# Patient Record
Sex: FEMALE | Race: WHITE | Employment: PART TIME | URBAN - METROPOLITAN AREA
[De-identification: names, ages, dates, MRNs, and addresses within clinical notes are randomized per-mention and may not be internally consistent; named-entity substitution may affect disease eponyms.]

---

## 2019-09-24 DIAGNOSIS — R53.83 FATIGUE, UNSPECIFIED TYPE: ICD-10-CM

## 2019-09-24 DIAGNOSIS — L65.9 HAIR LOSS: ICD-10-CM

## 2019-09-24 LAB
BASOPHILS ABSOLUTE: 0 K/UL (ref 0–0.2)
BASOPHILS RELATIVE PERCENT: 1 %
EOSINOPHILS ABSOLUTE: 0.1 K/UL (ref 0–0.6)
EOSINOPHILS RELATIVE PERCENT: 2.5 %
HCT VFR BLD CALC: 37.9 % (ref 36–48)
HEMOGLOBIN: 12.6 G/DL (ref 12–16)
LYMPHOCYTES ABSOLUTE: 1.3 K/UL (ref 1–5.1)
LYMPHOCYTES RELATIVE PERCENT: 36.3 %
MCH RBC QN AUTO: 31 PG (ref 26–34)
MCHC RBC AUTO-ENTMCNC: 33.2 G/DL (ref 31–36)
MCV RBC AUTO: 93.3 FL (ref 80–100)
MONOCYTES ABSOLUTE: 0.2 K/UL (ref 0–1.3)
MONOCYTES RELATIVE PERCENT: 5.6 %
NEUTROPHILS ABSOLUTE: 1.9 K/UL (ref 1.7–7.7)
NEUTROPHILS RELATIVE PERCENT: 54.6 %
PDW BLD-RTO: 12.7 % (ref 12.4–15.4)
PLATELET # BLD: 236 K/UL (ref 135–450)
PMV BLD AUTO: 8.5 FL (ref 5–10.5)
RBC # BLD: 4.06 M/UL (ref 4–5.2)
T4 FREE: 1.4 NG/DL (ref 0.9–1.8)
TSH SERPL DL<=0.05 MIU/L-ACNC: 1.02 UIU/ML (ref 0.27–4.2)
WBC # BLD: 3.6 K/UL (ref 4–11)

## 2020-05-08 DIAGNOSIS — R20.2 PARESTHESIAS: ICD-10-CM

## 2020-05-08 LAB
A/G RATIO: 1.7 (ref 1.1–2.2)
ALBUMIN SERPL-MCNC: 4.5 G/DL (ref 3.4–5)
ALP BLD-CCNC: 57 U/L (ref 40–129)
ALT SERPL-CCNC: 40 U/L (ref 10–40)
ANION GAP SERPL CALCULATED.3IONS-SCNC: 13 MMOL/L (ref 3–16)
AST SERPL-CCNC: 20 U/L (ref 15–37)
BASOPHILS ABSOLUTE: 0 K/UL (ref 0–0.2)
BASOPHILS RELATIVE PERCENT: 0.6 %
BILIRUB SERPL-MCNC: 0.3 MG/DL (ref 0–1)
BUN BLDV-MCNC: 13 MG/DL (ref 7–20)
CALCIUM SERPL-MCNC: 9.1 MG/DL (ref 8.3–10.6)
CHLORIDE BLD-SCNC: 106 MMOL/L (ref 99–110)
CO2: 19 MMOL/L (ref 21–32)
CREAT SERPL-MCNC: 0.7 MG/DL (ref 0.6–1.1)
EOSINOPHILS ABSOLUTE: 0.1 K/UL (ref 0–0.6)
EOSINOPHILS RELATIVE PERCENT: 1.5 %
FOLATE: 7.87 NG/ML (ref 4.78–24.2)
GFR AFRICAN AMERICAN: >60
GFR NON-AFRICAN AMERICAN: >60
GLOBULIN: 2.6 G/DL
GLUCOSE BLD-MCNC: 93 MG/DL (ref 70–99)
HCT VFR BLD CALC: 42.7 % (ref 36–48)
HEMOGLOBIN: 14.2 G/DL (ref 12–16)
LYMPHOCYTES ABSOLUTE: 1.8 K/UL (ref 1–5.1)
LYMPHOCYTES RELATIVE PERCENT: 35.3 %
MCH RBC QN AUTO: 31.1 PG (ref 26–34)
MCHC RBC AUTO-ENTMCNC: 33.4 G/DL (ref 31–36)
MCV RBC AUTO: 93.2 FL (ref 80–100)
MONOCYTES ABSOLUTE: 0.3 K/UL (ref 0–1.3)
MONOCYTES RELATIVE PERCENT: 6.4 %
NEUTROPHILS ABSOLUTE: 2.9 K/UL (ref 1.7–7.7)
NEUTROPHILS RELATIVE PERCENT: 56.2 %
PDW BLD-RTO: 13.1 % (ref 12.4–15.4)
PLATELET # BLD: 233 K/UL (ref 135–450)
PMV BLD AUTO: 8.9 FL (ref 5–10.5)
POTASSIUM SERPL-SCNC: 4.6 MMOL/L (ref 3.5–5.1)
RBC # BLD: 4.58 M/UL (ref 4–5.2)
SODIUM BLD-SCNC: 138 MMOL/L (ref 136–145)
T4 FREE: 1 NG/DL (ref 0.9–1.8)
TOTAL PROTEIN: 7.1 G/DL (ref 6.4–8.2)
TSH SERPL DL<=0.05 MIU/L-ACNC: 1.51 UIU/ML (ref 0.27–4.2)
VITAMIN B-12: 656 PG/ML (ref 211–911)
WBC # BLD: 5.1 K/UL (ref 4–11)

## 2021-03-17 PROBLEM — G47.33 OSA (OBSTRUCTIVE SLEEP APNEA): Status: ACTIVE | Noted: 2021-03-17

## 2021-06-15 DIAGNOSIS — G47.00 INSOMNIA, UNSPECIFIED TYPE: ICD-10-CM

## 2021-06-15 DIAGNOSIS — G25.81 RESTLESS LEGS: ICD-10-CM

## 2021-06-15 DIAGNOSIS — Z83.3 FAMILY HISTORY OF DIABETES MELLITUS (DM): ICD-10-CM

## 2021-06-15 DIAGNOSIS — Z11.59 NEED FOR HEPATITIS C SCREENING TEST: ICD-10-CM

## 2021-06-15 LAB
A/G RATIO: 2 (ref 1.1–2.2)
ALBUMIN SERPL-MCNC: 4.3 G/DL (ref 3.4–5)
ALP BLD-CCNC: 56 U/L (ref 40–129)
ALT SERPL-CCNC: 23 U/L (ref 10–40)
ANION GAP SERPL CALCULATED.3IONS-SCNC: 11 MMOL/L (ref 3–16)
AST SERPL-CCNC: 17 U/L (ref 15–37)
BASOPHILS ABSOLUTE: 0 K/UL (ref 0–0.2)
BASOPHILS RELATIVE PERCENT: 0.8 %
BILIRUB SERPL-MCNC: 0.3 MG/DL (ref 0–1)
BUN BLDV-MCNC: 13 MG/DL (ref 7–20)
CALCIUM SERPL-MCNC: 8.9 MG/DL (ref 8.3–10.6)
CHLORIDE BLD-SCNC: 105 MMOL/L (ref 99–110)
CO2: 23 MMOL/L (ref 21–32)
CREAT SERPL-MCNC: 0.6 MG/DL (ref 0.6–1.1)
EOSINOPHILS ABSOLUTE: 0.1 K/UL (ref 0–0.6)
EOSINOPHILS RELATIVE PERCENT: 1.4 %
GFR AFRICAN AMERICAN: >60
GFR NON-AFRICAN AMERICAN: >60
GLOBULIN: 2.2 G/DL
GLUCOSE BLD-MCNC: 88 MG/DL (ref 70–99)
HCT VFR BLD CALC: 37.9 % (ref 36–48)
HEMOGLOBIN: 13.2 G/DL (ref 12–16)
HEPATITIS C ANTIBODY INTERPRETATION: NORMAL
IRON SATURATION: 36 % (ref 15–50)
IRON: 94 UG/DL (ref 37–145)
LYMPHOCYTES ABSOLUTE: 1.7 K/UL (ref 1–5.1)
LYMPHOCYTES RELATIVE PERCENT: 32.8 %
MCH RBC QN AUTO: 31.6 PG (ref 26–34)
MCHC RBC AUTO-ENTMCNC: 34.7 G/DL (ref 31–36)
MCV RBC AUTO: 90.9 FL (ref 80–100)
MONOCYTES ABSOLUTE: 0.3 K/UL (ref 0–1.3)
MONOCYTES RELATIVE PERCENT: 5.7 %
NEUTROPHILS ABSOLUTE: 3 K/UL (ref 1.7–7.7)
NEUTROPHILS RELATIVE PERCENT: 59.3 %
PDW BLD-RTO: 12.8 % (ref 12.4–15.4)
PLATELET # BLD: 238 K/UL (ref 135–450)
PMV BLD AUTO: 8.7 FL (ref 5–10.5)
POTASSIUM SERPL-SCNC: 4.8 MMOL/L (ref 3.5–5.1)
RBC # BLD: 4.17 M/UL (ref 4–5.2)
SODIUM BLD-SCNC: 139 MMOL/L (ref 136–145)
TOTAL IRON BINDING CAPACITY: 261 UG/DL (ref 260–445)
TOTAL PROTEIN: 6.5 G/DL (ref 6.4–8.2)
TSH SERPL DL<=0.05 MIU/L-ACNC: 1.14 UIU/ML (ref 0.27–4.2)
WBC # BLD: 5 K/UL (ref 4–11)

## 2021-06-16 LAB
ESTIMATED AVERAGE GLUCOSE: 91.1 MG/DL
HBA1C MFR BLD: 4.8 %

## 2023-05-26 DIAGNOSIS — Z83.3 FAMILY HISTORY OF DIABETES MELLITUS (DM): ICD-10-CM

## 2023-05-26 DIAGNOSIS — Z13.220 SCREENING CHOLESTEROL LEVEL: ICD-10-CM

## 2023-05-26 DIAGNOSIS — G47.33 OSA (OBSTRUCTIVE SLEEP APNEA): ICD-10-CM

## 2023-05-26 LAB
CHOLEST SERPL-MCNC: 205 MG/DL (ref 0–199)
HDLC SERPL-MCNC: 59 MG/DL (ref 40–60)
LDL CHOLESTEROL CALCULATED: 112 MG/DL
TRIGL SERPL-MCNC: 171 MG/DL (ref 0–150)
VLDLC SERPL CALC-MCNC: 34 MG/DL

## 2023-05-27 LAB
EST. AVERAGE GLUCOSE BLD GHB EST-MCNC: 96.8 MG/DL
HBA1C MFR BLD: 5 %

## 2023-06-30 ENCOUNTER — OFFICE VISIT (OUTPATIENT)
Dept: FAMILY MEDICINE CLINIC | Age: 38
End: 2023-06-30
Payer: COMMERCIAL

## 2023-06-30 VITALS
DIASTOLIC BLOOD PRESSURE: 78 MMHG | HEART RATE: 94 BPM | WEIGHT: 233 LBS | OXYGEN SATURATION: 98 % | HEIGHT: 63 IN | SYSTOLIC BLOOD PRESSURE: 110 MMHG | BODY MASS INDEX: 41.29 KG/M2

## 2023-06-30 DIAGNOSIS — G47.33 OSA (OBSTRUCTIVE SLEEP APNEA): ICD-10-CM

## 2023-06-30 DIAGNOSIS — G47.00 INSOMNIA, UNSPECIFIED TYPE: ICD-10-CM

## 2023-06-30 DIAGNOSIS — G44.209 TENSION HEADACHE: ICD-10-CM

## 2023-06-30 DIAGNOSIS — E78.2 MIXED HYPERLIPIDEMIA: ICD-10-CM

## 2023-06-30 PROCEDURE — 99214 OFFICE O/P EST MOD 30 MIN: CPT

## 2023-06-30 RX ORDER — BUTALBITAL, ACETAMINOPHEN AND CAFFEINE 50; 325; 40 MG/1; MG/1; MG/1
1 TABLET ORAL EVERY 8 HOURS PRN
Qty: 30 TABLET | Refills: 1 | Status: SHIPPED | OUTPATIENT
Start: 2023-06-30

## 2023-06-30 RX ORDER — PEN NEEDLE, DIABETIC 31 GX5/16"
1 NEEDLE, DISPOSABLE MISCELLANEOUS DAILY
Qty: 12 EACH | Refills: 1 | Status: SHIPPED | OUTPATIENT
Start: 2023-06-30

## 2023-06-30 RX ORDER — DOXEPIN HYDROCHLORIDE 75 MG/1
75 CAPSULE ORAL NIGHTLY
Qty: 90 CAPSULE | Refills: 1 | Status: SHIPPED | OUTPATIENT
Start: 2023-06-30 | End: 2023-12-27

## 2023-06-30 ASSESSMENT — ENCOUNTER SYMPTOMS: SHORTNESS OF BREATH: 0

## 2023-08-16 DIAGNOSIS — E78.2 MIXED HYPERLIPIDEMIA: ICD-10-CM

## 2023-08-16 DIAGNOSIS — G47.33 OSA (OBSTRUCTIVE SLEEP APNEA): ICD-10-CM

## 2023-08-16 RX ORDER — LIRAGLUTIDE 6 MG/ML
INJECTION SUBCUTANEOUS
Qty: 6 ML | OUTPATIENT
Start: 2023-08-16

## 2023-09-08 ENCOUNTER — OFFICE VISIT (OUTPATIENT)
Dept: FAMILY MEDICINE CLINIC | Age: 38
End: 2023-09-08
Payer: COMMERCIAL

## 2023-09-08 VITALS
OXYGEN SATURATION: 99 % | HEIGHT: 63 IN | DIASTOLIC BLOOD PRESSURE: 78 MMHG | HEART RATE: 107 BPM | BODY MASS INDEX: 39.8 KG/M2 | RESPIRATION RATE: 18 BRPM | SYSTOLIC BLOOD PRESSURE: 118 MMHG | WEIGHT: 224.6 LBS

## 2023-09-08 DIAGNOSIS — L30.9 ECZEMA, UNSPECIFIED TYPE: Primary | ICD-10-CM

## 2023-09-08 DIAGNOSIS — E78.2 MIXED HYPERLIPIDEMIA: ICD-10-CM

## 2023-09-08 DIAGNOSIS — K21.9 GASTROESOPHAGEAL REFLUX DISEASE, UNSPECIFIED WHETHER ESOPHAGITIS PRESENT: ICD-10-CM

## 2023-09-08 DIAGNOSIS — G47.33 OSA (OBSTRUCTIVE SLEEP APNEA): ICD-10-CM

## 2023-09-08 PROCEDURE — 99214 OFFICE O/P EST MOD 30 MIN: CPT

## 2023-09-08 RX ORDER — PREDNISONE 20 MG/1
40 TABLET ORAL DAILY
Qty: 10 TABLET | Refills: 0 | Status: SHIPPED | OUTPATIENT
Start: 2023-09-08 | End: 2023-09-13

## 2023-09-08 RX ORDER — PANTOPRAZOLE SODIUM 40 MG/1
40 TABLET, DELAYED RELEASE ORAL DAILY
Qty: 90 TABLET | Refills: 1 | Status: SHIPPED | OUTPATIENT
Start: 2023-09-08 | End: 2024-03-06

## 2023-09-08 RX ORDER — PEN NEEDLE, DIABETIC 31 GX5/16"
1 NEEDLE, DISPOSABLE MISCELLANEOUS DAILY
Qty: 12 EACH | Refills: 1 | Status: SHIPPED | OUTPATIENT
Start: 2023-09-08

## 2023-09-08 RX ORDER — PANTOPRAZOLE SODIUM 40 MG/1
TABLET, DELAYED RELEASE ORAL
COMMUNITY
Start: 2023-07-11 | End: 2023-09-08 | Stop reason: SDUPTHER

## 2023-09-08 RX ORDER — FLUOCINONIDE 0.5 MG/G
OINTMENT TOPICAL
Qty: 60 G | Refills: 1 | Status: SHIPPED | OUTPATIENT
Start: 2023-09-08 | End: 2023-09-15

## 2023-09-08 SDOH — ECONOMIC STABILITY: HOUSING INSECURITY
IN THE LAST 12 MONTHS, WAS THERE A TIME WHEN YOU DID NOT HAVE A STEADY PLACE TO SLEEP OR SLEPT IN A SHELTER (INCLUDING NOW)?: NO

## 2023-09-08 SDOH — ECONOMIC STABILITY: INCOME INSECURITY: HOW HARD IS IT FOR YOU TO PAY FOR THE VERY BASICS LIKE FOOD, HOUSING, MEDICAL CARE, AND HEATING?: NOT HARD AT ALL

## 2023-09-08 SDOH — ECONOMIC STABILITY: TRANSPORTATION INSECURITY
IN THE PAST 12 MONTHS, HAS LACK OF TRANSPORTATION KEPT YOU FROM MEETINGS, WORK, OR FROM GETTING THINGS NEEDED FOR DAILY LIVING?: NO

## 2023-09-08 SDOH — ECONOMIC STABILITY: FOOD INSECURITY: WITHIN THE PAST 12 MONTHS, THE FOOD YOU BOUGHT JUST DIDN'T LAST AND YOU DIDN'T HAVE MONEY TO GET MORE.: NEVER TRUE

## 2023-09-08 SDOH — ECONOMIC STABILITY: FOOD INSECURITY: WITHIN THE PAST 12 MONTHS, YOU WORRIED THAT YOUR FOOD WOULD RUN OUT BEFORE YOU GOT MONEY TO BUY MORE.: NEVER TRUE

## 2023-09-08 ASSESSMENT — ENCOUNTER SYMPTOMS
CONSTIPATION: 0
BLOOD IN STOOL: 0
ABDOMINAL PAIN: 0
VOMITING: 0
SHORTNESS OF BREATH: 0
DIARRHEA: 0
NAUSEA: 0

## 2023-09-08 NOTE — PROGRESS NOTES
9/8/2023    SARA [address] [facility] [address]    Chief Complaint   Patient presents with    1 Month Follow-Up     6 weeks. No complaints. Rash     Rash arms, hands, neck, chest X 1 week. Itches. HPI  History was obtained from patient. Julio Geller is a pleasant 42 y.o. female who presents today for 6 week follow up on weight management with saxenda. Weight management: started on Saxenda 1.8mg  in June- has tolerated well. Had a GI virus and took a week off but has resumed without incident. She notes that she has had some increased acid reflux that is worse at night time- started on Protonix and this has resolved. Down 16lbs since beginning of the year, feeling better and noticing that clothes are fitting better. Rash: Rash on bilateral arms across her neck. Itchy. No one else has this rash. She notes that she has had significant increase in stress. Denies known exposure, has not changed any hygiene products or soaps. 1. Eczema, unspecified type    2. BMI 40.0-44.9, adult ([address] [facility])    3. Mixed hyperlipidemia    4. ELIJAH (obstructive sleep apnea)    5. Gastroesophageal reflux disease, unspecified whether esophagitis present             REVIEW OF SYMPTOMS    Review of Systems   Respiratory:  Negative for shortness of breath. Cardiovascular:  Negative for chest pain and palpitations. Gastrointestinal:  Negative for abdominal pain, blood in stool, constipation, diarrhea, nausea and vomiting. Skin:  Positive for rash.      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

## 2023-11-29 DIAGNOSIS — G47.00 INSOMNIA, UNSPECIFIED TYPE: ICD-10-CM

## 2023-11-30 RX ORDER — DOXEPIN HYDROCHLORIDE 75 MG/1
CAPSULE ORAL
Qty: 90 CAPSULE | Refills: 1 | Status: SHIPPED | OUTPATIENT
Start: 2023-11-30

## 2023-12-04 ENCOUNTER — OFFICE VISIT (OUTPATIENT)
Dept: FAMILY MEDICINE CLINIC | Age: 38
End: 2023-12-04
Payer: COMMERCIAL

## 2023-12-04 VITALS
HEIGHT: 63 IN | WEIGHT: 222.3 LBS | RESPIRATION RATE: 16 BRPM | HEART RATE: 102 BPM | OXYGEN SATURATION: 98 % | BODY MASS INDEX: 39.39 KG/M2 | SYSTOLIC BLOOD PRESSURE: 114 MMHG | DIASTOLIC BLOOD PRESSURE: 82 MMHG

## 2023-12-04 DIAGNOSIS — G44.209 TENSION HEADACHE: ICD-10-CM

## 2023-12-04 DIAGNOSIS — G47.33 OSA (OBSTRUCTIVE SLEEP APNEA): ICD-10-CM

## 2023-12-04 DIAGNOSIS — E78.2 MIXED HYPERLIPIDEMIA: ICD-10-CM

## 2023-12-04 DIAGNOSIS — K21.9 GASTROESOPHAGEAL REFLUX DISEASE, UNSPECIFIED WHETHER ESOPHAGITIS PRESENT: Primary | ICD-10-CM

## 2023-12-04 PROCEDURE — 99214 OFFICE O/P EST MOD 30 MIN: CPT

## 2023-12-04 RX ORDER — FAMOTIDINE 20 MG/1
20 TABLET, FILM COATED ORAL 2 TIMES DAILY
Qty: 120 TABLET | Refills: 0 | Status: SHIPPED | OUTPATIENT
Start: 2023-12-04

## 2023-12-04 RX ORDER — OMEPRAZOLE 40 MG/1
40 CAPSULE, DELAYED RELEASE ORAL 2 TIMES DAILY
Qty: 120 CAPSULE | Refills: 0 | Status: SHIPPED | OUTPATIENT
Start: 2023-12-04

## 2023-12-04 RX ORDER — BUTALBITAL, ACETAMINOPHEN AND CAFFEINE 50; 325; 40 MG/1; MG/1; MG/1
1 TABLET ORAL EVERY 8 HOURS PRN
Qty: 30 TABLET | Refills: 1 | Status: SHIPPED | OUTPATIENT
Start: 2023-12-04

## 2023-12-04 RX ORDER — CYCLOBENZAPRINE HCL 10 MG
10 TABLET ORAL 3 TIMES DAILY PRN
Qty: 30 TABLET | Refills: 0 | Status: SHIPPED | OUTPATIENT
Start: 2023-12-04 | End: 2023-12-14

## 2023-12-04 ASSESSMENT — ENCOUNTER SYMPTOMS
NAUSEA: 1
VOMITING: 1
ABDOMINAL PAIN: 1
DIARRHEA: 0
CONSTIPATION: 0
BLOOD IN STOOL: 0
SHORTNESS OF BREATH: 0

## 2023-12-04 NOTE — PROGRESS NOTES
12/4/2023    SARA [facility]    Chief Complaint   Patient presents with    Gastroesophageal Reflux     Having a lot of indigestion, burping all the time, pain at base of sternum. Medication and food feels like it is stuck in her throat. Has vomited twice now and it was bile. HPI  History was obtained from patient. Esequiel Bautista is a pleasant 34 y.o. female who presents today for GERD. Reports that she wakes up and goes to bed with indigestion, she has thrown up bile twice in the past month. She reports discomfort in her epigastric area. Increased belching. She has not been taking any NSAIDs. Of note she was hospitalized with nausea na vomiting in July and CT of the abdomen showed: Impression  1. Fluid-filled small bowel and large bowel loops could be due to diarrhea or an enterocolitis. 2.  No obstructive uropathy. 3.  Cholecystectomy and hysterectomy. She has never had an EGD. She is still taking Liraglutide, when she had the hospitalization over the summer she was advised to stop the GLP1 for a few weeks to see if symptoms improved and they did not so she restarted it. When she eats or drinks her epigastric pain occurs within minutes and lasts for a few hours. No changes in BMs, no blood in stool or black stools. 1. Gastroesophageal reflux disease, unspecified whether esophagitis present    2. Tension headache    3. BMI 40.0-44.9, adult ([address])    4. Mixed hyperlipidemia    5. ELIJAH (obstructive sleep apnea)         REVIEW OF SYMPTOMS    Review of Systems   Respiratory:  Negative for shortness of breath. Cardiovascular:  Negative for chest pain and palpitations. Gastrointestinal:  Positive for abdominal pain, nausea and vomiting. Negative for blood in stool, constipation and diarrhea.        PAST MEDICAL HISTORY  Past Medical History:   Diagnosis Date    Anxiety     Broken teeth     Bottom Right, Bottom Left    Eczema 5/7/2019    Herpes Dx 2013    \"Herpes Of Right Eye\"    Herpes simplex

## 2023-12-04 NOTE — PATIENT INSTRUCTIONS
[facility]  [address], [address] [facility], [address]  [phone]

## 2023-12-05 ENCOUNTER — TELEPHONE (OUTPATIENT)
Dept: GASTROENTEROLOGY | Age: 38
End: 2023-12-05

## 2023-12-12 ENCOUNTER — TELEPHONE (OUTPATIENT)
Dept: FAMILY MEDICINE CLINIC | Age: 38
End: 2023-12-12

## 2023-12-12 DIAGNOSIS — K21.9 GASTROESOPHAGEAL REFLUX DISEASE, UNSPECIFIED WHETHER ESOPHAGITIS PRESENT: Primary | ICD-10-CM

## 2023-12-12 NOTE — TELEPHONE ENCOUNTER
HB REF THIS PT TO [facility] BUT THEY CAN'T GET HER IN UNTIL THE END OF JAN.  PT WANTS TO CX THAT REF AND HAVE A REF SENT TO [facility] ON [address].

## 2023-12-31 DIAGNOSIS — E78.2 MIXED HYPERLIPIDEMIA: ICD-10-CM

## 2023-12-31 DIAGNOSIS — G47.33 OSA (OBSTRUCTIVE SLEEP APNEA): ICD-10-CM

## 2024-01-02 RX ORDER — LIRAGLUTIDE 6 MG/ML
INJECTION SUBCUTANEOUS
Qty: 9 ML | OUTPATIENT
Start: 2024-01-02

## 2024-01-25 DIAGNOSIS — G47.33 OSA (OBSTRUCTIVE SLEEP APNEA): ICD-10-CM

## 2024-01-25 DIAGNOSIS — E78.2 MIXED HYPERLIPIDEMIA: ICD-10-CM

## 2024-01-25 RX ORDER — PEN NEEDLE, DIABETIC 31 GX5/16"
1 NEEDLE, DISPOSABLE MISCELLANEOUS DAILY
Qty: 12 EACH | Refills: 1 | Status: SHIPPED | OUTPATIENT
Start: 2024-01-25

## 2024-02-19 ENCOUNTER — OFFICE VISIT (OUTPATIENT)
Dept: FAMILY MEDICINE CLINIC | Age: 39
End: 2024-02-19
Payer: COMMERCIAL

## 2024-02-19 VITALS
HEIGHT: 63 IN | WEIGHT: 223.2 LBS | SYSTOLIC BLOOD PRESSURE: 132 MMHG | TEMPERATURE: 98 F | DIASTOLIC BLOOD PRESSURE: 98 MMHG | HEART RATE: 112 BPM | RESPIRATION RATE: 16 BRPM | BODY MASS INDEX: 39.55 KG/M2 | OXYGEN SATURATION: 96 %

## 2024-02-19 DIAGNOSIS — J06.9 VIRAL URI: ICD-10-CM

## 2024-02-19 DIAGNOSIS — R03.0 ELEVATED BLOOD PRESSURE READING: ICD-10-CM

## 2024-02-19 DIAGNOSIS — U07.1 COVID-19: Primary | ICD-10-CM

## 2024-02-19 LAB
INFLUENZA A ANTIGEN, POC: NEGATIVE
INFLUENZA B ANTIGEN, POC: NEGATIVE
LOT EXPIRE DATE: ABNORMAL
LOT KIT NUMBER: ABNORMAL
SARS-COV-2, POC: DETECTED
VALID INTERNAL CONTROL: ABNORMAL
VENDOR AND KIT NAME POC: ABNORMAL

## 2024-02-19 PROCEDURE — 87428 SARSCOV & INF VIR A&B AG IA: CPT | Performed by: NURSE PRACTITIONER

## 2024-02-19 PROCEDURE — 99213 OFFICE O/P EST LOW 20 MIN: CPT | Performed by: NURSE PRACTITIONER

## 2024-02-19 ASSESSMENT — ENCOUNTER SYMPTOMS
COUGH: 0
SINUS PRESSURE: 1
SORE THROAT: 1
SHORTNESS OF BREATH: 0
WHEEZING: 0
DIARRHEA: 0
SWOLLEN GLANDS: 0
NAUSEA: 0
RHINORRHEA: 1
CHEST TIGHTNESS: 0
ABDOMINAL PAIN: 0
VOMITING: 0
SINUS PAIN: 1

## 2024-02-19 NOTE — PROGRESS NOTES
SARA VELASQUEZ   38 y.o.  female  [id number]      Chief Complaint   Patient presents with    URI     X2 Days, pt states she was tested on Friday at [facility] with  and was negative, and was given Tamiflu and has been taking that.         Subjective:  38 y.o.female is here for a follow up. She has the following chronic/acute medical problems:  Patient Active Problem List   Diagnosis    Pyelonephritis    Menorrhagia, premenopausal    Obesity    Herpes simplex keratoconjunctivitis    Eczema    Paresthesias    Insomnia    ELIJAH (obstructive sleep apnea)    Hypersomnia    Ex-smoker    Restless leg syndrome    Anemia    ALEXANDER (generalized anxiety disorder)    Lipoma of abdominal wall       URI   This is a new problem. The current episode started yesterday. The problem has been unchanged. There has been no fever. Associated symptoms include congestion (nasal congestion), headaches, rhinorrhea, sinus pain, sneezing (states a little not much) and a sore throat (states a little - states scratchy). Pertinent negatives include no abdominal pain, chest pain, coughing, diarrhea, dysuria, ear pain, joint pain, joint swelling, nausea, neck pain, plugged ear sensation, rash, swollen glands, vomiting or wheezing. Treatments tried: tamiflu prophalatically cause everyone in household had influenza - tylenol cold and flu. The treatment provided mild relief.       Review of Systems   Constitutional:  Positive for fatigue. Negative for appetite change, chills and fever.   HENT:  Positive for congestion (nasal congestion), rhinorrhea, sinus pressure, sinus pain, sneezing (states a little not much) and sore throat (states a little - states scratchy). Negative for ear pain and postnasal drip.    Respiratory:  Negative for cough, chest tightness, shortness of breath and wheezing.    Cardiovascular:  Negative for chest pain.   Gastrointestinal:  Negative for abdominal pain, diarrhea, nausea and vomiting.   Genitourinary:  Negative

## 2024-02-23 ENCOUNTER — NURSE ONLY (OUTPATIENT)
Dept: FAMILY MEDICINE CLINIC | Age: 39
End: 2024-02-23
Payer: COMMERCIAL

## 2024-02-23 DIAGNOSIS — G43.909 ACUTE MIGRAINE: Primary | ICD-10-CM

## 2024-02-23 PROCEDURE — 96372 THER/PROPH/DIAG INJ SC/IM: CPT

## 2024-02-23 RX ORDER — SUMATRIPTAN 50 MG/1
50 TABLET, FILM COATED ORAL
Qty: 9 TABLET | Refills: 0 | Status: SHIPPED | OUTPATIENT
Start: 2024-02-23 | End: 2024-02-23

## 2024-02-23 RX ORDER — KETOROLAC TROMETHAMINE 30 MG/ML
60 INJECTION, SOLUTION INTRAMUSCULAR; INTRAVENOUS ONCE
Status: COMPLETED | OUTPATIENT
Start: 2024-02-23 | End: 2024-02-23

## 2024-02-23 RX ADMIN — KETOROLAC TROMETHAMINE 60 MG: 30 INJECTION, SOLUTION INTRAMUSCULAR; INTRAVENOUS at 12:51

## 2024-03-10 DIAGNOSIS — G44.209 TENSION HEADACHE: ICD-10-CM

## 2024-03-11 ENCOUNTER — OFFICE VISIT (OUTPATIENT)
Dept: FAMILY MEDICINE CLINIC | Age: 39
End: 2024-03-11
Payer: COMMERCIAL

## 2024-03-11 VITALS
OXYGEN SATURATION: 98 % | SYSTOLIC BLOOD PRESSURE: 120 MMHG | HEIGHT: 63 IN | DIASTOLIC BLOOD PRESSURE: 88 MMHG | WEIGHT: 223 LBS | BODY MASS INDEX: 39.51 KG/M2 | HEART RATE: 94 BPM

## 2024-03-11 DIAGNOSIS — U07.1 COVID-19: ICD-10-CM

## 2024-03-11 DIAGNOSIS — H10.33 ACUTE CONJUNCTIVITIS OF BOTH EYES, UNSPECIFIED ACUTE CONJUNCTIVITIS TYPE: Primary | ICD-10-CM

## 2024-03-11 PROCEDURE — 99213 OFFICE O/P EST LOW 20 MIN: CPT | Performed by: PHYSICIAN ASSISTANT

## 2024-03-11 RX ORDER — OFLOXACIN 3 MG/ML
2 SOLUTION/ DROPS OPHTHALMIC
COMMUNITY
Start: 2024-03-09 | End: 2024-03-16

## 2024-03-11 RX ORDER — DOXEPIN HYDROCHLORIDE 75 MG/1
CAPSULE ORAL
Qty: 30 CAPSULE | Refills: 0 | Status: CANCELLED | OUTPATIENT
Start: 2024-03-11

## 2024-03-11 RX ORDER — SUMATRIPTAN 50 MG/1
50 TABLET, FILM COATED ORAL
Qty: 9 TABLET | Refills: 0 | Status: CANCELLED | OUTPATIENT
Start: 2024-03-11 | End: 2024-03-11

## 2024-03-11 RX ORDER — VALACYCLOVIR HYDROCHLORIDE 500 MG/1
500 TABLET, FILM COATED ORAL 2 TIMES DAILY PRN
Qty: 14 TABLET | Refills: 0 | Status: CANCELLED | OUTPATIENT
Start: 2024-03-11

## 2024-03-11 RX ORDER — TOBRAMYCIN AND DEXAMETHASONE 3; 1 MG/ML; MG/ML
1 SUSPENSION/ DROPS OPHTHALMIC 3 TIMES DAILY
Qty: 10 ML | Refills: 0 | Status: SHIPPED | OUTPATIENT
Start: 2024-03-11 | End: 2024-03-16

## 2024-03-11 RX ORDER — BUTALBITAL, ACETAMINOPHEN AND CAFFEINE 50; 325; 40 MG/1; MG/1; MG/1
1 TABLET ORAL EVERY 8 HOURS PRN
Qty: 30 TABLET | Refills: 1 | Status: SHIPPED | OUTPATIENT
Start: 2024-03-11

## 2024-03-11 NOTE — PROGRESS NOTES
day.  Stop the Ofloxacin drops.  Start Tobradex.  Return as needed.    COVID-19       There are no discontinued medications.     No follow-ups on file.     Plan of care reviewed with patient who verbalizes understanding and wishes to continue.   Patient to call with any questions or concerns.                 Please note that this chart was generated using dragon dictation software.  Although every effort was made to ensure the accuracy of this automated transcription, some errors in transcription may have occurred.    Electronically signed by MESFIN BRODY PA-C on 3/11/2024

## 2024-04-02 ENCOUNTER — TELEPHONE (OUTPATIENT)
Dept: FAMILY MEDICINE CLINIC | Age: 39
End: 2024-04-02

## 2024-04-02 ENCOUNTER — OFFICE VISIT (OUTPATIENT)
Dept: FAMILY MEDICINE CLINIC | Age: 39
End: 2024-04-02
Payer: COMMERCIAL

## 2024-04-02 VITALS
SYSTOLIC BLOOD PRESSURE: 124 MMHG | DIASTOLIC BLOOD PRESSURE: 80 MMHG | BODY MASS INDEX: 39.6 KG/M2 | HEART RATE: 94 BPM | OXYGEN SATURATION: 99 % | HEIGHT: 63 IN | WEIGHT: 223.5 LBS

## 2024-04-02 DIAGNOSIS — G47.33 OSA (OBSTRUCTIVE SLEEP APNEA): ICD-10-CM

## 2024-04-02 DIAGNOSIS — E78.2 MIXED HYPERLIPIDEMIA: ICD-10-CM

## 2024-04-02 DIAGNOSIS — G44.209 TENSION HEADACHE: ICD-10-CM

## 2024-04-02 DIAGNOSIS — G47.00 INSOMNIA, UNSPECIFIED TYPE: ICD-10-CM

## 2024-04-02 PROCEDURE — 99214 OFFICE O/P EST MOD 30 MIN: CPT

## 2024-04-02 RX ORDER — DOXEPIN HYDROCHLORIDE 75 MG/1
CAPSULE ORAL
Qty: 90 CAPSULE | Refills: 1 | Status: CANCELLED | OUTPATIENT
Start: 2024-04-02

## 2024-04-02 RX ORDER — DOXEPIN HYDROCHLORIDE 25 MG/1
CAPSULE ORAL
Qty: 21 CAPSULE | Refills: 0 | Status: SHIPPED | OUTPATIENT
Start: 2024-04-02 | End: 2024-04-16

## 2024-04-02 RX ORDER — CYCLOBENZAPRINE HCL 10 MG
10 TABLET ORAL 3 TIMES DAILY PRN
Qty: 30 TABLET | Refills: 0 | Status: SHIPPED | OUTPATIENT
Start: 2024-04-02 | End: 2024-04-12

## 2024-04-02 ASSESSMENT — ENCOUNTER SYMPTOMS: SHORTNESS OF BREATH: 0

## 2024-04-02 NOTE — TELEPHONE ENCOUNTER
Please call pharmacy concerning the Doxepin 25 mg script sent today 4-2-24.   Patient was on 75 mg qd the last time script was sent in.

## 2024-04-02 NOTE — PROGRESS NOTES
CPAP.  She has tried several times in addition to several different masks but has not been able to tolerate this. Consider Lunesta/Quviviq if Dayvigo ineffective. PDMP reviewed.   - Lemborexant 5 MG TABS; Take 5-10 mg by mouth nightly Max Daily Amount: 10 mg  Dispense: 60 tablet; Refill: 1  - doxepin (SINEQUAN) 25 MG capsule; Take 2 capsules by mouth nightly for 7 days, THEN 1 capsule nightly for 7 days.  Dispense: 21 capsule; Refill: 0      Return in about 4 weeks (around 4/30/2024) for insomnia.          Plan of care reviewed with patient as described above, all questions answered at this time. Patient agreeable with plan of care.       Electronically signed by NELL Becerra CNP on 4/2/2024

## 2024-04-03 ENCOUNTER — TELEPHONE (OUTPATIENT)
Dept: FAMILY MEDICINE CLINIC | Age: 39
End: 2024-04-03

## 2024-04-08 NOTE — TELEPHONE ENCOUNTER
Pt called back, she spoke with [employer] and found out she doesn't need PA through them.  Sabina was trying to use [employer] which caused the PA request.

## 2024-04-08 NOTE — TELEPHONE ENCOUNTER
Initiated PA, msg that states pt isn't eligible with [employer].  I called pt, verified info, she is calling [employer] and will call me back.

## 2024-04-30 ENCOUNTER — TELEMEDICINE (OUTPATIENT)
Dept: FAMILY MEDICINE CLINIC | Age: 39
End: 2024-04-30
Payer: COMMERCIAL

## 2024-04-30 ENCOUNTER — TELEPHONE (OUTPATIENT)
Dept: FAMILY MEDICINE CLINIC | Age: 39
End: 2024-04-30

## 2024-04-30 DIAGNOSIS — G47.00 INSOMNIA, UNSPECIFIED TYPE: ICD-10-CM

## 2024-04-30 DIAGNOSIS — F90.9 ADULT ADHD: Primary | ICD-10-CM

## 2024-04-30 DIAGNOSIS — G47.33 OSA (OBSTRUCTIVE SLEEP APNEA): ICD-10-CM

## 2024-04-30 DIAGNOSIS — E78.2 MIXED HYPERLIPIDEMIA: ICD-10-CM

## 2024-04-30 PROCEDURE — 99423 OL DIG E/M SVC 21+ MIN: CPT

## 2024-04-30 RX ORDER — LEMBOREXANT 10 MG/1
10 TABLET, FILM COATED ORAL
Qty: 30 TABLET | Refills: 2 | Status: SHIPPED | OUTPATIENT
Start: 2024-04-30

## 2024-04-30 RX ORDER — TIRZEPATIDE 5 MG/.5ML
5 INJECTION, SOLUTION SUBCUTANEOUS WEEKLY
Qty: 6 ML | Refills: 1 | Status: SHIPPED | OUTPATIENT
Start: 2024-04-30

## 2024-04-30 RX ORDER — DEXTROAMPHETAMINE SACCHARATE, AMPHETAMINE ASPARTATE MONOHYDRATE, DEXTROAMPHETAMINE SULFATE AND AMPHETAMINE SULFATE 2.5; 2.5; 2.5; 2.5 MG/1; MG/1; MG/1; MG/1
10 CAPSULE, EXTENDED RELEASE ORAL DAILY
Qty: 30 CAPSULE | Refills: 0 | Status: SHIPPED | OUTPATIENT
Start: 2024-04-30 | End: 2024-05-30

## 2024-04-30 ASSESSMENT — ENCOUNTER SYMPTOMS: SHORTNESS OF BREATH: 0

## 2024-04-30 NOTE — PROGRESS NOTES
4/30/2024    TELEHEALTH EVALUATION -- Audio/Visual (During COVID-19 public health emergency)    HPI:    Insomnia: Has taken Ambien, trazodone and doxepin for sleep before but none of these have helped. Weaned off Doxepin and started on Dayvigo at last visit. She notes that this has helped somewhat- she notes that her mind continues to race at bedtime. She notes dreams that center around her racing thoughts and this has caused her to wake up in panic. She has increased to 10 mg and is sleeping faster than she ever has before, the next day she feels well rested as long as she hasn't had panic issues.     ADHD: Both of her children are ADD/ADHD and she talked to Dr. Barboza about being assessed for this in the past. Her oldest is on Vyvanse and her youngest is on Adderall. Both have improved significantly since starting medications. No FH of sudden cardiac death before the age of 50. No personal history of seizures or cardiovascular disease.         4/30/2024     8:00 AM   Adult ADHD Self-Report Scale (ASRS) Symptom Checklist   How often do you make careless mistakes when you have to work on a boring or difficult project? 2   How often do you have difficulty keeping your attention when you are doing boring or repetitive work? 3   How often do you have difficulty concentrating on what people say to you, even when they are speaking to you directly? 3   How often do you have trouble wrapping up the final details of a project, once the challenging parts have been done? 2   How often do you have difficulty getting things in order when you have to do a task that requires organization? 3   When you have a task that requires a lot of thought, how often do you avoid or delay getting started? 4   How often do you misplace or have difficulty finding things at home or at work? 3   How often are you distracted by activity or noise around you? 4   How often do you have problems remembering appointments or obligations? 4   Part A -

## 2024-04-30 NOTE — TELEPHONE ENCOUNTER
LEMBOREXANT 5 MG-WAS TOLD SHE COULD TAKE 10 MG AND IT DOES WORK BETTER BUT NOW SHE IS NOT ABLE TO DO REFILL CAUSE IT'S TO EARLY. CAN THE REFILL BE CHANGED TO 10 MG AND RELEASED FOR ?

## 2024-05-13 RX ORDER — VALACYCLOVIR HYDROCHLORIDE 500 MG/1
500 TABLET, FILM COATED ORAL 2 TIMES DAILY PRN
Qty: 60 TABLET | Refills: 2 | Status: SHIPPED | OUTPATIENT
Start: 2024-05-13

## 2024-05-28 ENCOUNTER — OFFICE VISIT (OUTPATIENT)
Dept: FAMILY MEDICINE CLINIC | Age: 39
End: 2024-05-28
Payer: COMMERCIAL

## 2024-05-28 VITALS
BODY MASS INDEX: 36.86 KG/M2 | SYSTOLIC BLOOD PRESSURE: 110 MMHG | OXYGEN SATURATION: 98 % | DIASTOLIC BLOOD PRESSURE: 80 MMHG | WEIGHT: 208 LBS | HEART RATE: 91 BPM | HEIGHT: 63 IN

## 2024-05-28 DIAGNOSIS — Z12.31 ENCOUNTER FOR SCREENING MAMMOGRAM FOR MALIGNANT NEOPLASM OF BREAST: ICD-10-CM

## 2024-05-28 DIAGNOSIS — F90.9 ADULT ADHD: Primary | ICD-10-CM

## 2024-05-28 DIAGNOSIS — E66.01 CLASS 2 SEVERE OBESITY WITH SERIOUS COMORBIDITY AND BODY MASS INDEX (BMI) OF 36.0 TO 36.9 IN ADULT, UNSPECIFIED OBESITY TYPE (HCC): ICD-10-CM

## 2024-05-28 DIAGNOSIS — L65.9 HAIR LOSS: ICD-10-CM

## 2024-05-28 DIAGNOSIS — N94.9 GYNECOLOGICAL COMPLAINT: ICD-10-CM

## 2024-05-28 DIAGNOSIS — G47.00 INSOMNIA, UNSPECIFIED TYPE: ICD-10-CM

## 2024-05-28 PROCEDURE — 99214 OFFICE O/P EST MOD 30 MIN: CPT

## 2024-05-28 RX ORDER — DEXTROAMPHETAMINE SACCHARATE, AMPHETAMINE ASPARTATE MONOHYDRATE, DEXTROAMPHETAMINE SULFATE AND AMPHETAMINE SULFATE 2.5; 2.5; 2.5; 2.5 MG/1; MG/1; MG/1; MG/1
10 CAPSULE, EXTENDED RELEASE ORAL DAILY
Qty: 30 CAPSULE | Refills: 0 | Status: CANCELLED | OUTPATIENT
Start: 2024-05-28 | End: 2024-06-27

## 2024-05-28 RX ORDER — DEXTROAMPHETAMINE SACCHARATE, AMPHETAMINE ASPARTATE MONOHYDRATE, DEXTROAMPHETAMINE SULFATE AND AMPHETAMINE SULFATE 3.75; 3.75; 3.75; 3.75 MG/1; MG/1; MG/1; MG/1
CAPSULE, EXTENDED RELEASE ORAL
Qty: 30 CAPSULE | Refills: 0 | Status: SHIPPED | OUTPATIENT
Start: 2024-05-28 | End: 2024-08-26

## 2024-05-28 ASSESSMENT — ENCOUNTER SYMPTOMS
DIARRHEA: 0
VOMITING: 0
SHORTNESS OF BREATH: 0
CONSTIPATION: 0
NAUSEA: 0
ABDOMINAL PAIN: 0

## 2024-05-28 NOTE — PATIENT INSTRUCTIONS
[facility]   [address]   [place], [place] [address]    Hours  Monday- Friday     8am-4pm  **Closed for lunch from 12:30-1pm    [facility] Hours  [address]   [place], [place] [address]    Hours  Monday-Friday 7am-5pm  Saturday  8am-12pm

## 2024-05-28 NOTE — PROGRESS NOTES
5/28/2024    SARA LANG RON    Chief Complaint   Patient presents with    1 Month Follow-Up     Would like to discuss increasing Adderral dosage       HPI  History was obtained from patient.  SARA is a pleasant 38 y.o. female who presents today for 1 month follow-up for ADHD.    ADHD: tolerating Adderall XR 10 mg well but feels like it never hits the level of control that she needs throughout the day. She has appreciated an improvement in anxiety.     Weight: Has lost 15 lbs since starting Mounjaro.       Insomnia: Sleeping very well on Dayvigo- tolerating well       1. Adult ADHD    2. Class 2 severe obesity with serious comorbidity and body mass index (BMI) of 36.0 to 36.9 in adult, unspecified obesity type (HCC)    3. Insomnia, unspecified type    4. ELIJAH (obstructive sleep apnea)    5. Mixed hyperlipidemia    6. Encounter for screening mammogram for malignant neoplasm of breast    7. Hair loss    8. Gynecological complaint             REVIEW OF SYMPTOMS    Review of Systems   Respiratory:  Negative for shortness of breath.    Cardiovascular:  Negative for chest pain and palpitations.   Gastrointestinal:  Negative for abdominal pain, constipation, diarrhea, nausea and vomiting.   Psychiatric/Behavioral:  Negative for sleep disturbance. The patient is not nervous/anxious.        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

## 2024-06-03 DIAGNOSIS — F41.9 ACUTE ANXIETY: Primary | ICD-10-CM

## 2024-06-03 RX ORDER — LORAZEPAM 0.5 MG/1
.5-1 TABLET ORAL DAILY PRN
Qty: 2 TABLET | Refills: 0 | Status: SHIPPED | OUTPATIENT
Start: 2024-06-03 | End: 2024-06-04

## 2024-06-05 DIAGNOSIS — L65.9 HAIR LOSS: ICD-10-CM

## 2024-06-05 DIAGNOSIS — Z12.31 ENCOUNTER FOR SCREENING MAMMOGRAM FOR MALIGNANT NEOPLASM OF BREAST: ICD-10-CM

## 2024-06-05 DIAGNOSIS — R79.89 ELEVATED FERRITIN: Primary | ICD-10-CM

## 2024-06-05 LAB
25(OH)D3 SERPL-MCNC: 16.5 NG/ML
FERRITIN SERPL IA-MCNC: 330.9 NG/ML (ref 15–150)
FOLATE SERPL-MCNC: 5.74 NG/ML (ref 4.78–24.2)
T4 FREE SERPL-MCNC: 1.3 NG/DL (ref 0.9–1.8)
TSH SERPL DL<=0.005 MIU/L-ACNC: 1.61 UIU/ML (ref 0.27–4.2)
VIT B12 SERPL-MCNC: 898 PG/ML (ref 211–911)

## 2024-06-24 DIAGNOSIS — G44.209 TENSION HEADACHE: ICD-10-CM

## 2024-06-26 RX ORDER — BUTALBITAL, ACETAMINOPHEN AND CAFFEINE 50; 325; 40 MG/1; MG/1; MG/1
1 TABLET ORAL EVERY 8 HOURS PRN
Qty: 30 TABLET | Refills: 1 | Status: SHIPPED | OUTPATIENT
Start: 2024-06-26

## 2024-06-27 ENCOUNTER — TELEMEDICINE (OUTPATIENT)
Dept: FAMILY MEDICINE CLINIC | Age: 39
End: 2024-06-27
Payer: COMMERCIAL

## 2024-06-27 DIAGNOSIS — L65.9 HAIR LOSS: ICD-10-CM

## 2024-06-27 DIAGNOSIS — K58.2 IRRITABLE BOWEL SYNDROME WITH BOTH CONSTIPATION AND DIARRHEA: ICD-10-CM

## 2024-06-27 DIAGNOSIS — F90.9 ADULT ADHD: Primary | ICD-10-CM

## 2024-06-27 PROCEDURE — 99422 OL DIG E/M SVC 11-20 MIN: CPT

## 2024-06-27 RX ORDER — DICYCLOMINE HCL 20 MG
20 TABLET ORAL 4 TIMES DAILY PRN
Qty: 360 TABLET | Refills: 1 | Status: SHIPPED | OUTPATIENT
Start: 2024-06-27

## 2024-06-27 ASSESSMENT — ENCOUNTER SYMPTOMS
RESPIRATORY NEGATIVE: 1
DIARRHEA: 1
ABDOMINAL PAIN: 1
CONSTIPATION: 1

## 2024-06-27 NOTE — PATIENT INSTRUCTIONS
[facility]  [address] [facility] [address]  [place], [place] [address]  Phone - [phone]        [facility]   [address]   [place], [place] [address]    Hours  Monday- Friday     8am-4pm  **Closed for lunch from 12:30-1pm    [facility] Hours  [address]   [place], [place] [address]    Hours  Monday-Friday 7am-5pm  Saturday  8am-12pm

## 2024-06-27 NOTE — PROGRESS NOTES
CNP   SUMAtriptan (IMITREX) 50 MG tablet Take 1 tablet by mouth once as needed for Migraine May repeat dose in 2 hours if no improvement. Do not use more than 3 time a week due to rebound headache. Yes Paola Black APRN - CNP   omeprazole (PRILOSEC) 40 MG delayed release capsule Take 1 capsule by mouth in the morning and at bedtime Yes Paola Black APRN - CNP   famotidine (PEPCID) 20 MG tablet Take 1 tablet by mouth 2 times daily Yes Paola Black APRN - CNP   Insulin Pen Needle ([employer]ER PEN NEEDLES 31G) 31G X 8 MM MISC 1 each by Does not apply route daily  Дмитрий Ware PA       Social History     Tobacco Use    Smoking status: Former     Current packs/day: 0.00     Average packs/day: 0.3 packs/day for 12.0 years (3.0 ttl pk-yrs)     Types: Cigarettes, Cigars     Start date: 2000     Quit date:      Years since quittin.4    Smokeless tobacco: Never    Tobacco comments:     Off and on for 3 years   Vaping Use    Vaping Use: Never used   Substance Use Topics    Alcohol use: Yes     Alcohol/week: 5.0 standard drinks of alcohol     Types: 5 Cans of beer per week     Comment: \"Occ. Maybe Once A Week\"    Drug use: No              Current Outpatient Medications   Medication Sig Dispense Refill    dicyclomine (BENTYL) 20 MG tablet Take 1 tablet by mouth 4 times daily as needed (abdominal cramping) 360 tablet 1    butalbital-acetaminophen-caffeine (FIORICET, ESGIC) -40 MG per tablet Take 1 tablet by mouth every 8 hours as needed (Tension headaches) 30 tablet 1    amphetamine-dextroamphetamine (ADDERALL XR) 15 MG extended release capsule Take 1 capsule by mouth every morning for 30 days, THEN 1 capsule every morning for 30 days, THEN 1 capsule every morning for 30 days. Max Daily Amount: 15 mg. 30 capsule 0    valACYclovir (VALTREX) 500 MG tablet Take 1 tablet by mouth 2 times daily as needed (cold sores) 60 tablet 2    Tirzepatide (MOUNJARO) 5 MG/0.5ML SOPN SC injection Inject 0.5 mLs into the

## 2024-06-28 ENCOUNTER — TELEPHONE (OUTPATIENT)
Dept: FAMILY MEDICINE CLINIC | Age: 39
End: 2024-06-28

## 2024-06-28 NOTE — TELEPHONE ENCOUNTER
PT CALLED PHARM FOR HER REFILL AND THEY TOLD HER MOUNJARO-NEEDS PA AGAIN. THEY DO IT QUARTERLY. -DUE TO TAKE 7/1  THEY TOLD HER IF YOU CAN CALL [phone] TO DO VERBAL PA IT WILL BE FASTER

## 2024-07-01 DIAGNOSIS — F90.9 ADULT ADHD: ICD-10-CM

## 2024-07-01 RX ORDER — DEXTROAMPHETAMINE SACCHARATE, AMPHETAMINE ASPARTATE MONOHYDRATE, DEXTROAMPHETAMINE SULFATE AND AMPHETAMINE SULFATE 3.75; 3.75; 3.75; 3.75 MG/1; MG/1; MG/1; MG/1
CAPSULE, EXTENDED RELEASE ORAL
Qty: 30 CAPSULE | Refills: 0 | Status: SHIPPED | OUTPATIENT
Start: 2024-07-01 | End: 2024-09-28

## 2024-07-02 ENCOUNTER — TELEPHONE (OUTPATIENT)
Dept: FAMILY MEDICINE CLINIC | Age: 39
End: 2024-07-02

## 2024-07-02 DIAGNOSIS — E78.2 MIXED HYPERLIPIDEMIA: ICD-10-CM

## 2024-07-02 DIAGNOSIS — G47.33 OSA (OBSTRUCTIVE SLEEP APNEA): ICD-10-CM

## 2024-07-02 DIAGNOSIS — G47.00 INSOMNIA, UNSPECIFIED TYPE: ICD-10-CM

## 2024-07-02 NOTE — TELEPHONE ENCOUNTER
INS DENIED MOUNJARO-PT STATES THAT SHE WAS ON VICTOZA BEFORE AND INS HAD COVERED IT. PLEASE ADVISE

## 2024-07-03 ENCOUNTER — TELEPHONE (OUTPATIENT)
Dept: FAMILY MEDICINE CLINIC | Age: 39
End: 2024-07-03

## 2024-07-03 DIAGNOSIS — R79.89 ELEVATED FERRITIN: ICD-10-CM

## 2024-07-03 NOTE — TELEPHONE ENCOUNTER
To Lamine Zuluaga:    liraglutide-weight management 18 MG/3ML SOPN     This needs a Prior Authorization per patient.

## 2024-07-04 LAB
ALBUMIN SERPL-MCNC: 4.6 G/DL (ref 3.4–5)
ALP SERPL-CCNC: 74 U/L (ref 40–129)
ALT SERPL-CCNC: 76 U/L (ref 10–40)
AST SERPL-CCNC: 33 U/L (ref 15–37)
BASOPHILS # BLD: 0 K/UL (ref 0–0.2)
BASOPHILS NFR BLD: 0.5 %
BILIRUB DIRECT SERPL-MCNC: <0.2 MG/DL (ref 0–0.3)
BILIRUB INDIRECT SERPL-MCNC: ABNORMAL MG/DL (ref 0–1)
BILIRUB SERPL-MCNC: 0.4 MG/DL (ref 0–1)
DEPRECATED RDW RBC AUTO: 14.1 % (ref 12.4–15.4)
EOSINOPHIL # BLD: 0.1 K/UL (ref 0–0.6)
EOSINOPHIL NFR BLD: 2.6 %
HCT VFR BLD AUTO: 41.8 % (ref 36–48)
HGB BLD-MCNC: 13.9 G/DL (ref 12–16)
IRON SATN MFR SERPL: 33 % (ref 15–50)
IRON SERPL-MCNC: 82 UG/DL (ref 37–145)
LYMPHOCYTES # BLD: 1.8 K/UL (ref 1–5.1)
LYMPHOCYTES NFR BLD: 36 %
MCH RBC QN AUTO: 30.1 PG (ref 26–34)
MCHC RBC AUTO-ENTMCNC: 33.3 G/DL (ref 31–36)
MCV RBC AUTO: 90.4 FL (ref 80–100)
MONOCYTES # BLD: 0.3 K/UL (ref 0–1.3)
MONOCYTES NFR BLD: 6.7 %
NEUTROPHILS # BLD: 2.8 K/UL (ref 1.7–7.7)
NEUTROPHILS NFR BLD: 54.2 %
PLATELET # BLD AUTO: 279 K/UL (ref 135–450)
PMV BLD AUTO: 8.8 FL (ref 5–10.5)
PROT SERPL-MCNC: 7.5 G/DL (ref 6.4–8.2)
RBC # BLD AUTO: 4.63 M/UL (ref 4–5.2)
TIBC SERPL-MCNC: 249 UG/DL (ref 260–445)
WBC # BLD AUTO: 5.1 K/UL (ref 4–11)

## 2024-07-08 DIAGNOSIS — R74.8 ELEVATED LIVER ENZYMES: Primary | ICD-10-CM

## 2024-07-10 ENCOUNTER — TELEPHONE (OUTPATIENT)
Dept: FAMILY MEDICINE CLINIC | Age: 39
End: 2024-07-10

## 2024-07-10 NOTE — TELEPHONE ENCOUNTER
Pt contacted office stating the original PA  before determination was received.  Resubmitted PA:    ZEKE PARADA (Key: [id number] - [id number]  Mounjaro 2.5MG/0.5ML pen-injectors  Status: PA RequestCreated: 2024 [phone]: 2024

## 2024-07-15 ENCOUNTER — TELEPHONE (OUTPATIENT)
Dept: FAMILY MEDICINE CLINIC | Age: 39
End: 2024-07-15

## 2024-07-15 DIAGNOSIS — K76.9 LESION OF LIVER GREATER THAN 1 CM IN DIAMETER: Primary | ICD-10-CM

## 2024-07-16 ENCOUNTER — TELEMEDICINE (OUTPATIENT)
Dept: FAMILY MEDICINE CLINIC | Age: 39
End: 2024-07-16
Payer: COMMERCIAL

## 2024-07-16 DIAGNOSIS — R74.8 ELEVATED LIVER ENZYMES: ICD-10-CM

## 2024-07-16 DIAGNOSIS — K59.00 CONSTIPATION, UNSPECIFIED CONSTIPATION TYPE: ICD-10-CM

## 2024-07-16 DIAGNOSIS — R10.11 RUQ PAIN: ICD-10-CM

## 2024-07-16 DIAGNOSIS — E66.01 CLASS 2 SEVERE OBESITY WITH SERIOUS COMORBIDITY AND BODY MASS INDEX (BMI) OF 36.0 TO 36.9 IN ADULT, UNSPECIFIED OBESITY TYPE (HCC): Primary | ICD-10-CM

## 2024-07-16 PROCEDURE — 99423 OL DIG E/M SVC 21+ MIN: CPT

## 2024-07-16 RX ORDER — DOCUSATE SODIUM 100 MG/1
100 CAPSULE, LIQUID FILLED ORAL 2 TIMES DAILY PRN
Qty: 180 CAPSULE | Refills: 1
Start: 2024-07-16

## 2024-07-16 RX ORDER — POLYETHYLENE GLYCOL 3350 17 G/17G
17 POWDER, FOR SOLUTION ORAL DAILY PRN
Qty: 510 G | Refills: 0
Start: 2024-07-16 | End: 2024-08-15

## 2024-07-16 NOTE — PROGRESS NOTES
Initiated PA, faxed order to [facility]  
audio-video encounter. The patient (or guardian if applicable) is aware that this is a billable service, which includes applicable co-pays. This Virtual Visit was conducted with patient's (and/or legal guardian's) consent. Patient identification was verified, and a caregiver was present when appropriate.   The patient was located at Home: [address] [place], [place] [id number]  Provider was located at Facility (Appt Dept): [address] [place] [address]  [address]  [place] [address]  Confirm you are appropriately licensed, registered, or certified to deliver care in the [facility] where the patient is located as indicated above. If you are not or unsure, please re-schedule the visit: Yes, I confirm.       Total time spent on this encounter:  27 minutes    --NELL Becerra CNP on 7/16/2024 at 10:11 AM    An electronic signature was used to authenticate this note.

## 2024-07-17 ASSESSMENT — ENCOUNTER SYMPTOMS
DIARRHEA: 0
NAUSEA: 0
BLOOD IN STOOL: 0
RESPIRATORY NEGATIVE: 1
CONSTIPATION: 1
VOMITING: 0
ABDOMINAL PAIN: 1

## 2024-07-29 DIAGNOSIS — F90.9 ADULT ADHD: ICD-10-CM

## 2024-07-29 RX ORDER — DEXTROAMPHETAMINE SACCHARATE, AMPHETAMINE ASPARTATE MONOHYDRATE, DEXTROAMPHETAMINE SULFATE AND AMPHETAMINE SULFATE 3.75; 3.75; 3.75; 3.75 MG/1; MG/1; MG/1; MG/1
15 CAPSULE, EXTENDED RELEASE ORAL EVERY MORNING
Qty: 30 CAPSULE | Refills: 0 | Status: SHIPPED | OUTPATIENT
Start: 2024-07-29 | End: 2024-08-28

## 2024-07-30 DIAGNOSIS — G47.00 INSOMNIA, UNSPECIFIED TYPE: ICD-10-CM

## 2024-07-30 RX ORDER — LEMBOREXANT 10 MG/1
10 TABLET, FILM COATED ORAL
Qty: 30 TABLET | Refills: 2 | Status: SHIPPED | OUTPATIENT
Start: 2024-07-30

## 2024-08-29 ENCOUNTER — OFFICE VISIT (OUTPATIENT)
Dept: FAMILY MEDICINE CLINIC | Age: 39
End: 2024-08-29
Payer: COMMERCIAL

## 2024-08-29 VITALS
HEIGHT: 63 IN | DIASTOLIC BLOOD PRESSURE: 86 MMHG | SYSTOLIC BLOOD PRESSURE: 132 MMHG | BODY MASS INDEX: 34.55 KG/M2 | OXYGEN SATURATION: 98 % | HEART RATE: 94 BPM | WEIGHT: 195 LBS

## 2024-08-29 DIAGNOSIS — G44.209 TENSION HEADACHE: ICD-10-CM

## 2024-08-29 DIAGNOSIS — G47.00 INSOMNIA, UNSPECIFIED TYPE: ICD-10-CM

## 2024-08-29 DIAGNOSIS — F90.9 ADULT ADHD: Primary | ICD-10-CM

## 2024-08-29 PROCEDURE — 99214 OFFICE O/P EST MOD 30 MIN: CPT

## 2024-08-29 RX ORDER — LEMBOREXANT 10 MG/1
10 TABLET, FILM COATED ORAL
Qty: 30 TABLET | Refills: 0 | Status: SHIPPED | OUTPATIENT
Start: 2024-08-29

## 2024-08-29 RX ORDER — DEXTROAMPHETAMINE SACCHARATE, AMPHETAMINE ASPARTATE MONOHYDRATE, DEXTROAMPHETAMINE SULFATE AND AMPHETAMINE SULFATE 5; 5; 5; 5 MG/1; MG/1; MG/1; MG/1
20 CAPSULE, EXTENDED RELEASE ORAL EVERY MORNING
Qty: 30 CAPSULE | Refills: 0 | Status: SHIPPED | OUTPATIENT
Start: 2024-08-29 | End: 2024-08-30 | Stop reason: SDUPTHER

## 2024-08-29 RX ORDER — SUMATRIPTAN 50 MG/1
50 TABLET, FILM COATED ORAL
Qty: 9 TABLET | Refills: 0 | Status: SHIPPED | OUTPATIENT
Start: 2024-08-29 | End: 2024-08-29

## 2024-08-29 RX ORDER — CYCLOBENZAPRINE HCL 10 MG
10 TABLET ORAL 3 TIMES DAILY PRN
Qty: 30 TABLET | Refills: 2 | Status: SHIPPED | OUTPATIENT
Start: 2024-08-29 | End: 2024-09-28

## 2024-08-29 ASSESSMENT — ENCOUNTER SYMPTOMS
GASTROINTESTINAL NEGATIVE: 1
RESPIRATORY NEGATIVE: 1

## 2024-08-29 NOTE — PROGRESS NOTES
tablet by mouth 2 times daily as needed (cold sores) 60 tablet 2    liraglutide-weight management 18 MG/3ML SOPN Inject 0.6 mg subcutaneously once daily for 1 week; increase by 0.6 mg daily at weekly intervals to a target dose of 3 mg once daily (Patient not taking: Reported on 7/16/2024) 5 Adjustable Dose Pre-filled Pen Syringe 2    Insulin Pen Needle (KR[employer]ER PEN NEEDLES 31G) 31G X 8 MM MISC 1 each by Does not apply route daily 12 each 1    omeprazole (PRILOSEC) 40 MG delayed release capsule Take 1 capsule by mouth in the morning and at bedtime (Patient not taking: Reported on 8/29/2024) 120 capsule 0    famotidine (PEPCID) 20 MG tablet Take 1 tablet by mouth 2 times daily (Patient not taking: Reported on 8/29/2024) 120 tablet 0     No current facility-administered medications for this visit.       ALLERGIES  No Known Allergies    PHYSICAL EXAM    /86 (Site: Right Upper Arm, Position: Sitting, Cuff Size: Medium Adult)   Pulse 94   Ht 1.6 m (5' 3\")   Wt 88.5 kg (195 lb)   LMP 03/20/2018 (Exact Date)   SpO2 98%   BMI 34.54 kg/m²     Physical Exam  Vitals and nursing note reviewed.   Constitutional:       General: She is not in acute distress.     Appearance: Normal appearance. She is well-developed. She is obese.   HENT:      Head: Normocephalic and atraumatic.   Eyes:      General: No scleral icterus.     Conjunctiva/sclera: Conjunctivae normal.   Neck:      Thyroid: No thyromegaly.      Trachea: No tracheal deviation.   Cardiovascular:      Rate and Rhythm: Normal rate.   Pulmonary:      Effort: Pulmonary effort is normal. No respiratory distress.   Abdominal:      Palpations: There is no hepatomegaly.   Skin:     General: Skin is warm and dry.      Nails: There is no clubbing.   Neurological:      Mental Status: She is alert and oriented to person, place, and time. Mental status is at baseline.   Psychiatric:         Mood and Affect: Mood normal.         Behavior: Behavior normal.         Thought  Content: Thought content normal.         Judgment: Judgment normal.         ASSESSMENT & PLAN  1. Adult ADHD  - amphetamine-dextroamphetamine (ADDERALL XR) 20 MG extended release capsule; Take 1 capsule by mouth every morning for 30 days. Max Daily Amount: 20 mg  Dispense: 30 capsule; Refill: 0    2. Insomnia, unspecified type  - Lemborexant (DAYVIGO) 10 MG TABS; Take 10 mg by mouth nightly Max Daily Amount: 10 mg  Dispense: 30 tablet; Refill: 0    3. Tension headache  - cyclobenzaprine (FLEXERIL) 10 MG tablet; Take 1 tablet by mouth 3 times daily as needed for Muscle spasms  Dispense: 30 tablet; Refill: 2  - SUMAtriptan (IMITREX) 50 MG tablet; Take 1 tablet by mouth once as needed for Migraine May repeat dose in 2 hours if no improvement. Do not use more than 3 time a week due to rebound headache.  Dispense: 9 tablet; Refill: 0    Will trial dose increase of Adderall, suspect that recent increase in stress and anxiety may be to blame for worsening focus, increased difficulty with insomnia and increased tension headaches.  Will plan dose adjustment for Adderall and consider a trial of Quviviq for insomnia if Dayvigo is no longer effective.    The patient has been prescribed controlled substance medication for management of moderate to severe chronic insomnia and ADHD symptoms. The patient is using their controlled substance medication appropriately to increase activity and functional capacity both mentally and physically. There are no signs of addiction or diversion. There are no side effects reported. [place]AR[place] report was reviewed and no abnormalities were identified. Medications were refilled.       Return in about 26 days (around 9/24/2024) for ADHD/Insomnia/TTH.          Plan of care reviewed with patient as described above, all questions answered at this time. Patient agreeable with plan of care.       Electronically signed by NELL Becerra CNP on 8/29/2024

## 2024-08-30 ENCOUNTER — TELEPHONE (OUTPATIENT)
Dept: FAMILY MEDICINE CLINIC | Age: 39
End: 2024-08-30

## 2024-08-30 DIAGNOSIS — F90.9 ADULT ADHD: ICD-10-CM

## 2024-08-30 RX ORDER — DEXTROAMPHETAMINE SACCHARATE, AMPHETAMINE ASPARTATE MONOHYDRATE, DEXTROAMPHETAMINE SULFATE AND AMPHETAMINE SULFATE 5; 5; 5; 5 MG/1; MG/1; MG/1; MG/1
20 CAPSULE, EXTENDED RELEASE ORAL EVERY MORNING
Qty: 30 CAPSULE | Refills: 0 | Status: SHIPPED | OUTPATIENT
Start: 2024-08-30 | End: 2024-08-30

## 2024-08-30 RX ORDER — DEXTROAMPHETAMINE SACCHARATE, AMPHETAMINE ASPARTATE MONOHYDRATE, DEXTROAMPHETAMINE SULFATE AND AMPHETAMINE SULFATE 5; 5; 5; 5 MG/1; MG/1; MG/1; MG/1
20 CAPSULE, EXTENDED RELEASE ORAL EVERY MORNING
Qty: 30 CAPSULE | Refills: 0 | Status: SHIPPED | OUTPATIENT
Start: 2024-08-30 | End: 2024-09-29

## 2024-09-24 DIAGNOSIS — F90.9 ADULT ADHD: ICD-10-CM

## 2024-09-25 DIAGNOSIS — G47.00 INSOMNIA, UNSPECIFIED TYPE: Primary | ICD-10-CM

## 2024-09-25 RX ORDER — DEXTROAMPHETAMINE SACCHARATE, AMPHETAMINE ASPARTATE MONOHYDRATE, DEXTROAMPHETAMINE SULFATE AND AMPHETAMINE SULFATE 5; 5; 5; 5 MG/1; MG/1; MG/1; MG/1
20 CAPSULE, EXTENDED RELEASE ORAL EVERY MORNING
Qty: 30 CAPSULE | Refills: 0 | Status: SHIPPED | OUTPATIENT
Start: 2024-09-28 | End: 2024-10-28

## 2024-10-03 ENCOUNTER — TELEPHONE (OUTPATIENT)
Dept: FAMILY MEDICINE CLINIC | Age: 39
End: 2024-10-03

## 2024-10-03 DIAGNOSIS — G47.00 INSOMNIA, UNSPECIFIED TYPE: ICD-10-CM

## 2024-10-03 NOTE — TELEPHONE ENCOUNTER
DARIDOREXANT-ONCE SHE IS ASLEEP SHE GOOD BUT IT IS NOT QUICK TO GETTING HER TO SLEEP. IS THERE A HIGHER DOSE?  LV-8/29  NA-11/1    JAVIER SHELDON

## 2024-10-03 NOTE — TELEPHONE ENCOUNTER
Please Advise...Pt states she is struggling to fall asleep, she lays there for 2-3 hours tossing and turning

## 2024-10-28 DIAGNOSIS — G47.00 INSOMNIA, UNSPECIFIED TYPE: ICD-10-CM

## 2024-10-28 DIAGNOSIS — F90.9 ADULT ADHD: ICD-10-CM

## 2024-10-28 RX ORDER — DEXTROAMPHETAMINE SACCHARATE, AMPHETAMINE ASPARTATE MONOHYDRATE, DEXTROAMPHETAMINE SULFATE AND AMPHETAMINE SULFATE 5; 5; 5; 5 MG/1; MG/1; MG/1; MG/1
20 CAPSULE, EXTENDED RELEASE ORAL EVERY MORNING
Qty: 30 CAPSULE | Refills: 0 | Status: SHIPPED | OUTPATIENT
Start: 2024-10-28 | End: 2024-11-27

## 2024-10-29 DIAGNOSIS — G47.00 INSOMNIA, UNSPECIFIED TYPE: ICD-10-CM

## 2024-10-29 RX ORDER — DARIDOREXANT 50 MG/1
TABLET, FILM COATED ORAL
Qty: 14 TABLET | OUTPATIENT
Start: 2024-10-29

## 2024-11-25 ENCOUNTER — OFFICE VISIT (OUTPATIENT)
Dept: FAMILY MEDICINE CLINIC | Age: 39
End: 2024-11-25
Payer: COMMERCIAL

## 2024-11-25 VITALS
WEIGHT: 204 LBS | HEART RATE: 100 BPM | SYSTOLIC BLOOD PRESSURE: 126 MMHG | OXYGEN SATURATION: 99 % | HEIGHT: 63 IN | DIASTOLIC BLOOD PRESSURE: 80 MMHG | BODY MASS INDEX: 36.14 KG/M2

## 2024-11-25 DIAGNOSIS — K76.0 HEPATIC STEATOSIS: ICD-10-CM

## 2024-11-25 DIAGNOSIS — R74.01 ELEVATED ALT MEASUREMENT: ICD-10-CM

## 2024-11-25 DIAGNOSIS — B00.52 HERPES SIMPLEX KERATOCONJUNCTIVITIS: ICD-10-CM

## 2024-11-25 DIAGNOSIS — F51.01 PRIMARY INSOMNIA: Primary | ICD-10-CM

## 2024-11-25 DIAGNOSIS — R22.1 NODULE OF NECK: ICD-10-CM

## 2024-11-25 DIAGNOSIS — F90.9 ADULT ADHD: ICD-10-CM

## 2024-11-25 DIAGNOSIS — K21.9 GASTROESOPHAGEAL REFLUX DISEASE, UNSPECIFIED WHETHER ESOPHAGITIS PRESENT: ICD-10-CM

## 2024-11-25 DIAGNOSIS — D17.1 LIPOMA OF ABDOMINAL WALL: ICD-10-CM

## 2024-11-25 DIAGNOSIS — K59.04 CHRONIC IDIOPATHIC CONSTIPATION: ICD-10-CM

## 2024-11-25 PROBLEM — L72.0 EPIDERMOID CYST: Status: ACTIVE | Noted: 2024-11-25

## 2024-11-25 PROBLEM — L73.2 HIDRADENITIS SUPPURATIVA: Status: ACTIVE | Noted: 2024-11-25

## 2024-11-25 PROCEDURE — 99214 OFFICE O/P EST MOD 30 MIN: CPT

## 2024-11-25 RX ORDER — OMEPRAZOLE 40 MG/1
40 CAPSULE, DELAYED RELEASE ORAL 2 TIMES DAILY
Qty: 180 CAPSULE | Refills: 1 | Status: SHIPPED | OUTPATIENT
Start: 2024-11-25

## 2024-11-25 RX ORDER — DEXTROAMPHETAMINE SACCHARATE, AMPHETAMINE ASPARTATE MONOHYDRATE, DEXTROAMPHETAMINE SULFATE AND AMPHETAMINE SULFATE 5; 5; 5; 5 MG/1; MG/1; MG/1; MG/1
20 CAPSULE, EXTENDED RELEASE ORAL EVERY MORNING
Qty: 30 CAPSULE | Refills: 0 | Status: SHIPPED | OUTPATIENT
Start: 2024-11-25 | End: 2024-12-25

## 2024-11-25 RX ORDER — ESZOPICLONE 1 MG/1
1-2 TABLET, FILM COATED ORAL NIGHTLY
Qty: 30 TABLET | Refills: 0 | Status: SHIPPED | OUTPATIENT
Start: 2024-11-25 | End: 2024-12-10

## 2024-11-25 RX ORDER — LUBIPROSTONE 8 UG/1
8 CAPSULE ORAL 2 TIMES DAILY WITH MEALS
Qty: 60 CAPSULE | Refills: 1 | Status: SHIPPED | OUTPATIENT
Start: 2024-11-25

## 2024-11-25 RX ORDER — VALACYCLOVIR HYDROCHLORIDE 500 MG/1
500 TABLET, FILM COATED ORAL 2 TIMES DAILY PRN
Qty: 60 TABLET | Refills: 2 | Status: SHIPPED | OUTPATIENT
Start: 2024-11-25

## 2024-11-25 SDOH — ECONOMIC STABILITY: INCOME INSECURITY: HOW HARD IS IT FOR YOU TO PAY FOR THE VERY BASICS LIKE FOOD, HOUSING, MEDICAL CARE, AND HEATING?: NOT HARD AT ALL

## 2024-11-25 SDOH — ECONOMIC STABILITY: FOOD INSECURITY: WITHIN THE PAST 12 MONTHS, THE FOOD YOU BOUGHT JUST DIDN'T LAST AND YOU DIDN'T HAVE MONEY TO GET MORE.: NEVER TRUE

## 2024-11-25 SDOH — ECONOMIC STABILITY: FOOD INSECURITY: WITHIN THE PAST 12 MONTHS, YOU WORRIED THAT YOUR FOOD WOULD RUN OUT BEFORE YOU GOT MONEY TO BUY MORE.: NEVER TRUE

## 2024-11-25 ASSESSMENT — ENCOUNTER SYMPTOMS: RESPIRATORY NEGATIVE: 1

## 2024-11-25 NOTE — PROGRESS NOTES
11/25/2024    SARA LANG MARIA ELENASARYЕКАТЕРИНА    Chief Complaint   Patient presents with    Insomnia     Discuss current sleeping aid    GI Problem     Referral to new GI    Mass     Pt reports feeling a mass or lump in throat when talking or swallowing    Lipoma     Referral for general surgery       HPI  History was obtained from patient.  SARA is a pleasant 39 y.o. female who presents today for ADHD, insomnia medication monitoring.     Having vivid dreams on Quviviq- has woken up in panic attacks with this. Having difficulty determining what is reality when waking.     ADHD symptoms well controlled on current regimen.     Went to [facility] for Liver scan and blood work and she has not heard anything about results. She reports concerns about the fibro scan itself being accurate due to placement during the scan.     She also continues to have significant constipation and bloating.     History of abdominal lipoma on left side- would like referral to Dr. Killian.     For the past week or so she notes a lump in her throat, she can feel this on the inside when she swallows and describes this as a sore but there is no pain.     Care Gaps:  Influenza vaccination    1. Primary insomnia    2. Adult ADHD    3. Gastroesophageal reflux disease, unspecified whether esophagitis present    4. Herpes simplex keratoconjunctivitis    5. Elevated ALT measurement    6. Hepatic steatosis    7. Chronic idiopathic constipation    8. Lipoma of abdominal wall    9. Nodule of neck         REVIEW OF SYMPTOMS    Review of Systems   Respiratory: Negative.     Cardiovascular: Negative.    Psychiatric/Behavioral:  Positive for sleep disturbance. Negative for decreased concentration. The patient is nervous/anxious.        No data recorded    The 10-year ASCVD risk score (Pastor MOROCHO, et al., 2019) is: 0.5%    Values used to calculate the score:      Age: 39 years      Sex: Female      Is Non- : No      Diabetic: No

## 2024-11-27 ENCOUNTER — TELEPHONE (OUTPATIENT)
Dept: GASTROENTEROLOGY | Age: 39
End: 2024-11-27

## 2024-11-27 NOTE — TELEPHONE ENCOUNTER
Called pt. In regards to a referral for fatty liver and elevated alt. Made appt for pt to see dyan on 1/6/25 @4pm

## 2024-12-13 DIAGNOSIS — R22.1 NODULE OF NECK: Primary | ICD-10-CM

## 2024-12-21 ENCOUNTER — HOSPITAL ENCOUNTER (OUTPATIENT)
Dept: ULTRASOUND IMAGING | Age: 39
Discharge: HOME | End: 2024-12-21
Payer: COMMERCIAL

## 2024-12-21 DIAGNOSIS — R22.1 NODULE OF NECK: ICD-10-CM

## 2024-12-21 PROCEDURE — 76536 US EXAM OF HEAD AND NECK: CPT

## 2024-12-23 DIAGNOSIS — F51.01 PRIMARY INSOMNIA: ICD-10-CM

## 2024-12-23 DIAGNOSIS — F90.9 ADULT ADHD: ICD-10-CM

## 2024-12-23 NOTE — TELEPHONE ENCOUNTER
LAST APPT THIS DEPT  11/25/2024 - Comp  NEXT APPT  With Family Medicine (NELL Becerra - CNP)  02/25/2025 at 5:00 PM    amphetamine-dextroamphetamine (ADDERALL XR) 20 MG extended release capsule     eszopiclone (LUNESTA) 1 MG TABS     To be filled at: [facility] [id number] - [place] [id number] [facility] - P [phone] - F [phone]      Pended RX

## 2024-12-23 NOTE — TELEPHONE ENCOUNTER
Patient takes 2 pills of the Lunesta so she can sleep.     LV  11/25/24    [place]  2/25/25    [facility] on [address]

## 2024-12-24 RX ORDER — ESZOPICLONE 1 MG/1
1-2 TABLET, FILM COATED ORAL NIGHTLY
Qty: 60 TABLET | Refills: 0 | Status: SHIPPED | OUTPATIENT
Start: 2024-12-24 | End: 2025-01-23

## 2024-12-24 RX ORDER — DEXTROAMPHETAMINE SACCHARATE, AMPHETAMINE ASPARTATE MONOHYDRATE, DEXTROAMPHETAMINE SULFATE AND AMPHETAMINE SULFATE 5; 5; 5; 5 MG/1; MG/1; MG/1; MG/1
20 CAPSULE, EXTENDED RELEASE ORAL EVERY MORNING
Qty: 30 CAPSULE | Refills: 0 | Status: SHIPPED | OUTPATIENT
Start: 2024-12-24 | End: 2025-01-23

## 2025-01-23 DIAGNOSIS — F90.9 ADULT ADHD: ICD-10-CM

## 2025-01-23 DIAGNOSIS — G44.209 TENSION HEADACHE: ICD-10-CM

## 2025-01-24 DIAGNOSIS — F51.01 PRIMARY INSOMNIA: ICD-10-CM

## 2025-01-24 RX ORDER — SUMATRIPTAN 50 MG/1
50 TABLET, FILM COATED ORAL
Qty: 9 TABLET | Refills: 1 | Status: SHIPPED | OUTPATIENT
Start: 2025-01-24 | End: 2025-01-24

## 2025-01-24 RX ORDER — DEXTROAMPHETAMINE SACCHARATE, AMPHETAMINE ASPARTATE MONOHYDRATE, DEXTROAMPHETAMINE SULFATE AND AMPHETAMINE SULFATE 5; 5; 5; 5 MG/1; MG/1; MG/1; MG/1
20 CAPSULE, EXTENDED RELEASE ORAL EVERY MORNING
Qty: 30 CAPSULE | Refills: 0 | Status: SHIPPED | OUTPATIENT
Start: 2025-01-24 | End: 2025-02-23

## 2025-01-24 RX ORDER — ESZOPICLONE 1 MG/1
1-2 TABLET, FILM COATED ORAL NIGHTLY
Qty: 60 TABLET | Refills: 0 | Status: SHIPPED | OUTPATIENT
Start: 2025-01-24 | End: 2025-02-23

## 2025-02-26 DIAGNOSIS — F90.9 ADULT ADHD: ICD-10-CM

## 2025-02-27 RX ORDER — DEXTROAMPHETAMINE SACCHARATE, AMPHETAMINE ASPARTATE MONOHYDRATE, DEXTROAMPHETAMINE SULFATE AND AMPHETAMINE SULFATE 5; 5; 5; 5 MG/1; MG/1; MG/1; MG/1
20 CAPSULE, EXTENDED RELEASE ORAL EVERY MORNING
Qty: 30 CAPSULE | Refills: 0 | Status: SHIPPED | OUTPATIENT
Start: 2025-02-27 | End: 2025-03-29

## 2025-03-25 ENCOUNTER — OFFICE VISIT (OUTPATIENT)
Dept: FAMILY MEDICINE CLINIC | Age: 40
End: 2025-03-25
Payer: COMMERCIAL

## 2025-03-25 VITALS
OXYGEN SATURATION: 99 % | DIASTOLIC BLOOD PRESSURE: 76 MMHG | SYSTOLIC BLOOD PRESSURE: 106 MMHG | BODY MASS INDEX: 35.79 KG/M2 | WEIGHT: 202 LBS | HEIGHT: 63 IN | HEART RATE: 90 BPM

## 2025-03-25 DIAGNOSIS — F51.01 PRIMARY INSOMNIA: Primary | ICD-10-CM

## 2025-03-25 DIAGNOSIS — F90.9 ADULT ADHD: ICD-10-CM

## 2025-03-25 DIAGNOSIS — K59.04 CHRONIC IDIOPATHIC CONSTIPATION: ICD-10-CM

## 2025-03-25 DIAGNOSIS — L76.32 POSTOPERATIVE HEMATOMA OF SKIN FOLLOWING NON-DERMATOLOGIC PROCEDURE: ICD-10-CM

## 2025-03-25 PROBLEM — L03.311 ABDOMINAL WALL CELLULITIS: Status: ACTIVE | Noted: 2025-03-25

## 2025-03-25 PROCEDURE — 99214 OFFICE O/P EST MOD 30 MIN: CPT

## 2025-03-25 RX ORDER — DEXTROAMPHETAMINE SACCHARATE, AMPHETAMINE ASPARTATE MONOHYDRATE, DEXTROAMPHETAMINE SULFATE AND AMPHETAMINE SULFATE 6.25; 6.25; 6.25; 6.25 MG/1; MG/1; MG/1; MG/1
25 CAPSULE, EXTENDED RELEASE ORAL EVERY MORNING
Qty: 30 CAPSULE | Refills: 0 | Status: SHIPPED | OUTPATIENT
Start: 2025-03-25 | End: 2025-04-24

## 2025-03-25 RX ORDER — ESZOPICLONE 3 MG/1
3 TABLET, FILM COATED ORAL NIGHTLY
Qty: 30 TABLET | Refills: 2 | Status: SHIPPED | OUTPATIENT
Start: 2025-03-25 | End: 2025-06-23

## 2025-03-25 ASSESSMENT — ENCOUNTER SYMPTOMS
WHEEZING: 0
CONSTIPATION: 0
ABDOMINAL DISTENTION: 0
SHORTNESS OF BREATH: 0
DIARRHEA: 0
ROS SKIN COMMENTS: ABDOMINAL INCISIONS
BLOOD IN STOOL: 0
ABDOMINAL PAIN: 0
VOMITING: 0
COLOR CHANGE: 0
NAUSEA: 0

## 2025-03-25 ASSESSMENT — PATIENT HEALTH QUESTIONNAIRE - PHQ9
SUM OF ALL RESPONSES TO PHQ QUESTIONS 1-9: 0
1. LITTLE INTEREST OR PLEASURE IN DOING THINGS: NOT AT ALL
2. FEELING DOWN, DEPRESSED OR HOPELESS: NOT AT ALL

## 2025-03-25 NOTE — PROGRESS NOTES
3/25/2025    SARA PALMAKYMBERLYNLOBERTO    Chief Complaint   Patient presents with    3 Month Follow-Up    Medication Problem     Discuss dosage of adderall. Don't think its working as well       HPI  History was obtained from patient.  SARA is a pleasant 39 y.o. female who presents today for 3 month follow up for controlled medication management.     Some nights- she estimates about 50% of the time she has more difficulty getting to sleep. She has been trying to lie down earlier when possible and has a routine bedtime schedule. She will take 2 mg Lunesta by 8:30-9 pm and her hope is to be asleep by midnight.     ADHD- she notes that the 20 mg was controlling her symptoms well but she feels that over the last month or so she has not had as easy of a time focusing as she did before on this dose. She does still feel that she can feel the Adderall wear off toward the end of the afternoon.     1. Primary insomnia    2. Adult ADHD    3. Chronic idiopathic constipation    4. Postoperative hematoma of skin following non-dermatologic procedure         REVIEW OF SYMPTOMS    Review of Systems   Constitutional:  Negative for activity change, appetite change, chills, fever and unexpected weight change.   Respiratory:  Negative for shortness of breath and wheezing.    Cardiovascular:  Negative for chest pain and palpitations.   Gastrointestinal:  Negative for abdominal distention, abdominal pain, blood in stool, constipation, diarrhea, nausea and vomiting.   Skin:  Negative for color change.        Abdominal incisions    Neurological:  Negative for syncope and headaches.   Psychiatric/Behavioral:  Positive for decreased concentration and sleep disturbance. Negative for dysphoric mood. The patient is not nervous/anxious.        PHQ-9 Total Score: 0 (3/25/2025  4:48 PM)      The 10-year ASCVD risk score (Pastor MOROCHO, et al., 2019) is: 0.4%    Values used to calculate the score:      Age: 39 years      Sex: Female      Is

## 2025-04-21 DIAGNOSIS — F51.01 PRIMARY INSOMNIA: ICD-10-CM

## 2025-04-21 DIAGNOSIS — F90.9 ADULT ADHD: ICD-10-CM

## 2025-04-21 RX ORDER — DEXTROAMPHETAMINE SACCHARATE, AMPHETAMINE ASPARTATE MONOHYDRATE, DEXTROAMPHETAMINE SULFATE AND AMPHETAMINE SULFATE 6.25; 6.25; 6.25; 6.25 MG/1; MG/1; MG/1; MG/1
25 CAPSULE, EXTENDED RELEASE ORAL EVERY MORNING
Qty: 30 CAPSULE | Refills: 0 | Status: SHIPPED | OUTPATIENT
Start: 2025-04-25 | End: 2025-05-25

## 2025-04-21 RX ORDER — ESZOPICLONE 3 MG/1
3 TABLET, FILM COATED ORAL NIGHTLY
Qty: 30 TABLET | Refills: 2 | Status: SHIPPED | OUTPATIENT
Start: 2025-04-25 | End: 2025-07-24

## 2025-05-22 DIAGNOSIS — F90.9 ADULT ADHD: ICD-10-CM

## 2025-05-22 DIAGNOSIS — F51.01 PRIMARY INSOMNIA: ICD-10-CM

## 2025-05-23 RX ORDER — ESZOPICLONE 3 MG/1
3 TABLET, FILM COATED ORAL NIGHTLY
Qty: 30 TABLET | Refills: 2 | Status: SHIPPED | OUTPATIENT
Start: 2025-05-23 | End: 2025-08-21

## 2025-05-23 RX ORDER — DEXTROAMPHETAMINE SACCHARATE, AMPHETAMINE ASPARTATE MONOHYDRATE, DEXTROAMPHETAMINE SULFATE AND AMPHETAMINE SULFATE 6.25; 6.25; 6.25; 6.25 MG/1; MG/1; MG/1; MG/1
25 CAPSULE, EXTENDED RELEASE ORAL EVERY MORNING
Qty: 30 CAPSULE | Refills: 0 | Status: SHIPPED | OUTPATIENT
Start: 2025-05-23 | End: 2025-06-22

## 2025-05-24 DIAGNOSIS — K21.9 GASTROESOPHAGEAL REFLUX DISEASE, UNSPECIFIED WHETHER ESOPHAGITIS PRESENT: ICD-10-CM

## 2025-05-28 RX ORDER — OMEPRAZOLE 40 MG/1
40 CAPSULE, DELAYED RELEASE ORAL 2 TIMES DAILY
Qty: 180 CAPSULE | Refills: 1 | Status: SHIPPED | OUTPATIENT
Start: 2025-05-28

## 2025-06-20 DIAGNOSIS — F90.9 ADULT ADHD: ICD-10-CM

## 2025-06-20 DIAGNOSIS — F51.01 PRIMARY INSOMNIA: ICD-10-CM

## 2025-06-20 RX ORDER — DEXTROAMPHETAMINE SACCHARATE, AMPHETAMINE ASPARTATE MONOHYDRATE, DEXTROAMPHETAMINE SULFATE AND AMPHETAMINE SULFATE 6.25; 6.25; 6.25; 6.25 MG/1; MG/1; MG/1; MG/1
25 CAPSULE, EXTENDED RELEASE ORAL EVERY MORNING
Qty: 30 CAPSULE | Refills: 0 | Status: SHIPPED | OUTPATIENT
Start: 2025-06-23 | End: 2025-07-23

## 2025-06-20 RX ORDER — ESZOPICLONE 3 MG/1
3 TABLET, FILM COATED ORAL NIGHTLY
Qty: 30 TABLET | Refills: 2 | Status: SHIPPED | OUTPATIENT
Start: 2025-06-23 | End: 2025-09-21

## 2025-06-29 RX ORDER — DEXTROAMPHETAMINE SACCHARATE, AMPHETAMINE ASPARTATE MONOHYDRATE, DEXTROAMPHETAMINE SULFATE AND AMPHETAMINE SULFATE 3.75; 3.75; 3.75; 3.75 MG/1; MG/1; MG/1; MG/1
15 CAPSULE, EXTENDED RELEASE ORAL
COMMUNITY

## 2025-06-29 RX ORDER — CYCLOBENZAPRINE HCL 10 MG
10 TABLET ORAL
COMMUNITY

## 2025-07-16 DIAGNOSIS — G44.209 TENSION HEADACHE: ICD-10-CM

## 2025-07-16 DIAGNOSIS — F90.9 ADULT ADHD: ICD-10-CM

## 2025-07-16 RX ORDER — SUMATRIPTAN 50 MG/1
50 TABLET, FILM COATED ORAL
Qty: 9 TABLET | Refills: 1 | Status: SHIPPED | OUTPATIENT
Start: 2025-07-16

## 2025-07-16 RX ORDER — DEXTROAMPHETAMINE SACCHARATE, AMPHETAMINE ASPARTATE MONOHYDRATE, DEXTROAMPHETAMINE SULFATE AND AMPHETAMINE SULFATE 6.25; 6.25; 6.25; 6.25 MG/1; MG/1; MG/1; MG/1
25 CAPSULE, EXTENDED RELEASE ORAL EVERY MORNING
Qty: 30 CAPSULE | Refills: 0 | Status: SHIPPED | OUTPATIENT
Start: 2025-07-23 | End: 2025-08-22

## 2025-07-24 DIAGNOSIS — F90.9 ADULT ADHD: ICD-10-CM

## 2025-07-24 RX ORDER — DEXTROAMPHETAMINE SACCHARATE, AMPHETAMINE ASPARTATE MONOHYDRATE, DEXTROAMPHETAMINE SULFATE AND AMPHETAMINE SULFATE 6.25; 6.25; 6.25; 6.25 MG/1; MG/1; MG/1; MG/1
25 CAPSULE, EXTENDED RELEASE ORAL EVERY MORNING
Qty: 30 CAPSULE | Refills: 0 | OUTPATIENT
Start: 2025-07-24 | End: 2025-08-23

## 2025-08-07 ENCOUNTER — OFFICE VISIT (OUTPATIENT)
Dept: FAMILY MEDICINE CLINIC | Age: 40
End: 2025-08-07
Payer: COMMERCIAL

## 2025-08-07 VITALS
OXYGEN SATURATION: 100 % | HEIGHT: 63 IN | RESPIRATION RATE: 18 BRPM | WEIGHT: 197.3 LBS | SYSTOLIC BLOOD PRESSURE: 135 MMHG | BODY MASS INDEX: 34.96 KG/M2 | HEART RATE: 114 BPM | DIASTOLIC BLOOD PRESSURE: 96 MMHG

## 2025-08-07 DIAGNOSIS — K59.04 CHRONIC IDIOPATHIC CONSTIPATION: ICD-10-CM

## 2025-08-07 DIAGNOSIS — F90.9 ADULT ADHD: Primary | ICD-10-CM

## 2025-08-07 DIAGNOSIS — R51.9 ACUTE INTRACTABLE HEADACHE, UNSPECIFIED HEADACHE TYPE: ICD-10-CM

## 2025-08-07 DIAGNOSIS — Z87.898 H/O MOTION SICKNESS: ICD-10-CM

## 2025-08-07 DIAGNOSIS — F51.01 PRIMARY INSOMNIA: ICD-10-CM

## 2025-08-07 PROCEDURE — 99214 OFFICE O/P EST MOD 30 MIN: CPT

## 2025-08-07 PROCEDURE — 96372 THER/PROPH/DIAG INJ SC/IM: CPT

## 2025-08-07 RX ORDER — LUBIPROSTONE 24 UG/1
24 CAPSULE ORAL 2 TIMES DAILY WITH MEALS
Qty: 60 CAPSULE | Refills: 2 | Status: SHIPPED | OUTPATIENT
Start: 2025-08-07

## 2025-08-07 RX ORDER — KETOROLAC TROMETHAMINE 30 MG/ML
60 INJECTION, SOLUTION INTRAMUSCULAR; INTRAVENOUS ONCE
Status: COMPLETED | OUTPATIENT
Start: 2025-08-07 | End: 2025-08-07

## 2025-08-07 RX ORDER — SCOPOLAMINE 1 MG/3D
1 PATCH, EXTENDED RELEASE TRANSDERMAL
Qty: 4 PATCH | Refills: 0 | Status: SHIPPED | OUTPATIENT
Start: 2025-08-07

## 2025-08-07 RX ORDER — DEXTROAMPHETAMINE SACCHARATE, AMPHETAMINE ASPARTATE MONOHYDRATE, DEXTROAMPHETAMINE SULFATE AND AMPHETAMINE SULFATE 7.5; 7.5; 7.5; 7.5 MG/1; MG/1; MG/1; MG/1
30 CAPSULE, EXTENDED RELEASE ORAL EVERY MORNING
Qty: 30 CAPSULE | Refills: 0 | Status: SHIPPED | OUTPATIENT
Start: 2025-08-07 | End: 2025-09-06

## 2025-08-07 RX ORDER — BISACODYL 10 MG
10 SUPPOSITORY, RECTAL RECTAL DAILY PRN
Qty: 10 SUPPOSITORY | Refills: 0 | Status: SHIPPED | OUTPATIENT
Start: 2025-08-07 | End: 2025-08-17

## 2025-08-07 RX ADMIN — KETOROLAC TROMETHAMINE 60 MG: 30 INJECTION, SOLUTION INTRAMUSCULAR; INTRAVENOUS at 17:36

## 2025-08-07 SDOH — ECONOMIC STABILITY: FOOD INSECURITY: WITHIN THE PAST 12 MONTHS, THE FOOD YOU BOUGHT JUST DIDN'T LAST AND YOU DIDN'T HAVE MONEY TO GET MORE.: NEVER TRUE

## 2025-08-07 SDOH — ECONOMIC STABILITY: FOOD INSECURITY: WITHIN THE PAST 12 MONTHS, YOU WORRIED THAT YOUR FOOD WOULD RUN OUT BEFORE YOU GOT MONEY TO BUY MORE.: NEVER TRUE

## 2025-08-11 ASSESSMENT — ENCOUNTER SYMPTOMS
DIARRHEA: 0
COLOR CHANGE: 0
ABDOMINAL PAIN: 0
SHORTNESS OF BREATH: 0
ABDOMINAL DISTENTION: 0
VOMITING: 0
BLOOD IN STOOL: 0
NAUSEA: 0
WHEEZING: 0
CONSTIPATION: 1